# Patient Record
(demographics unavailable — no encounter records)

---

## 2025-01-22 NOTE — HISTORY OF PRESENT ILLNESS
[FreeTextEntry1] : JESS MORENO is a 7 mo ex FT normally developing F with Multicystic Kidney Disease (MKD) here for abnormal facial movements consistent of twitching of the R side of the face  HPI Normal , FT, no complications.  Passed hearing and  screening Normal development MKD- follows with Urology  1.5 mo started twitching R side of her face. She used to do it constantly at the beginning and now is a little less.  May happen more when she is excited. Lasts for few seconds. May happen several times per day. No clear associated LOC   FHx Dad has seizures.

## 2025-01-22 NOTE — PHYSICAL EXAM
[Well-appearing] : well-appearing [Normocephalic] : normocephalic [Anterior fontanel- Open] : anterior fontanel- open [Anterior fontanel- Soft] : anterior fontanel- soft [Anterior fontanel- Flat] : anterior fontanel- flat [No dysmorphic facial features] : no dysmorphic facial features [No ocular abnormalities] : no ocular abnormalities [Neck supple] : neck supple [No abnormal neurocutaneous stigmata or skin lesions] : no abnormal neurocutaneous stigmata or skin lesions [No deformities] : no deformities [Alert] : alert [Regards] : regards [Smiling] : smiling [Cooing] : cooing [Babbling] : babbling [Pupils reactive to light] : pupils reactive to light [Turns to light] : turns to light [Tracks face, light or objects with full extraocular movements] : tracks face, light or objects with full extraocular movements [No facial asymmetry or weakness] : no facial asymmetry or weakness [No nystagmus] : no nystagmus [Responds to voice/sounds] : responds to voice/sounds [Midline tongue] : midline tongue [No fasciculations] : no fasciculations [Normal axial and appendicular muscle tone with symmetric limb movements] : normal axial and appendicular muscle tone with symmetric limb movements [Normal bulk] : normal bulk [Reaches for toys] : reaches for toys [Good  bilaterally] : good  bilaterally [Lift head in prone] : lift head in prone [Roll over] : roll over [Tripod] : tripod [No abnormal involuntary movements] : no abnormal involuntary movements [2+ biceps] : 2+ biceps [Knee jerks] : knee jerks [Ankle jerks] : ankle jerks [No ankle clonus] : no ankle clonus [Responds to touch and tickle] : responds to touch and tickle [No dysmetria in reaching for objects] : no dysmetria in reaching for objects [de-identified] : no resp distress

## 2025-01-22 NOTE — ASSESSMENT
[FreeTextEntry1] : JESS MORENO is a 7 mo ex FT normally developing F with Multicystic Kidney Disease (MKD) here for abnormal facial movements consistent of twitching of the R side of the face lasting for 1-3 seconds at a time, that started 1-2 months ago.   Episodes seem to be less often now, but still happen multiple times a week. Mom feels they may be more frequent when she is excited. No clear associated LOC.   Given this, and normal exam, low suspicion for seizures, but father has epilepsy and so will do rEEG/AEEG to try to capture and characterize events

## 2025-03-07 NOTE — CONSULT LETTER
[Dear  ___] : Dear  [unfilled], [Consult Letter:] : I had the pleasure of evaluating your patient, [unfilled]. [Please see my note below.] : Please see my note below. [Consult Closing:] : Thank you very much for allowing me to participate in the care of this patient.  If you have any questions, please do not hesitate to contact me. [Sincerely,] : Sincerely, [FreeTextEntry3] : Jessica Thompson MD Department of Dermatology [facility]

## 2025-03-07 NOTE — HISTORY OF PRESENT ILLNESS
[FreeTextEntry1] : np rash [de-identified] : Referred by: Dr. Diaz Ms. JESS MORENO  is a 9 month old F here for evaluation of below  #eczema x months, becoming itchier over the winter  S: aquaphor M: aquaphor D: All F&C, no fs/ds/wb/fb  sibling with eczema no personal or family h/o skin cancer

## 2025-03-07 NOTE — CONSULT LETTER
[Dear  ___] : Dear  [unfilled], [Consult Letter:] : I had the pleasure of evaluating your patient, [unfilled]. [Please see my note below.] : Please see my note below. [Consult Closing:] : Thank you very much for allowing me to participate in the care of this patient.  If you have any questions, please do not hesitate to contact me. [Sincerely,] : Sincerely, [FreeTextEntry3] : Jessica Thompson MD Department of Dermatology [facility]

## 2025-03-07 NOTE — PHYSICAL EXAM
[Alert] : alert [FreeTextEntry3] : eczematous patches on cheeks, forehead, trunk, extremities sparing groin follicular eczema on trunk xerosis

## 2025-03-07 NOTE — ASSESSMENT
[Use of independent historian: [ enter independent historian's relationship to patient ] :____] : As the patient was unable to provide a complete and reliable history, I obtained clinical history from the patient's [unfilled] [FreeTextEntry1] : # Atopic dermatitis, with follicular eczema on trunk,  flared with #xerosis discussed nature, chronicity and unpredictable course Reviewed dry skin care, including bathing routines, emollients, and fragrance-free products. - Advised moisturizing w/ plain Vaseline. - start triamcinolone 0.1% ointment BID to AA on body PRN roughness.  - start hydrocortisone 2.5% ointment BID to AA on face PRN roughness.  - Reviewed SE of topical steroids including skin thinning and discoloration and discussed avoidance of prolonged use. - start dilute bleach baths. Instructions provided. - start modified short sleeve onesie wet wraps qHS. Instructions provided. - fragrance-free diapers.   RTC 2 weeks

## 2025-03-07 NOTE — HISTORY OF PRESENT ILLNESS
[FreeTextEntry1] : np rash [de-identified] : Referred by: Dr. Diaz Ms. JESS MORENO  is a 9 month old F here for evaluation of below  #eczema x months, becoming itchier over the winter  S: aquaphor M: aquaphor D: All F&C, no fs/ds/wb/fb  sibling with eczema no personal or family h/o skin cancer

## 2025-03-20 NOTE — PHYSICAL EXAM
[Alert] : alert [FreeTextEntry3] : hyperpigmented thin slightly rough patches on cheeks xerosis scalp clear

## 2025-03-20 NOTE — ASSESSMENT
[Use of independent historian: [ enter independent historian's relationship to patient ] :____] : As the patient was unable to provide a complete and reliable history, I obtained clinical history from the patient's [unfilled] [FreeTextEntry1] : # Atopic dermatitis, with follicular eczema on trunk,  improved on body, with mild residuum on face with #xerosis discussed nature, chronicity and unpredictable course improved but still active/flaring, not at treatment goal Reviewed dry skin care, including bathing routines, emollients, and fragrance-free products. - Advised moisturizing w/ plain Vaseline. - start fluocinolone scalp oil in scalp daily PRN pruritus, no longer than 2 weeks at a time, SED  - can start eucrisa to any minimally rough areas if covered, SED including initial stinging sensation - parent deferred tacro for face - triamcinolone 0.1% ointment BID to AA on body PRN roughness.  - hydrocortisone 2.5% ointment BID to AA on face PRN roughness.  - Reviewed SE of topical steroids including skin thinning and discoloration and discussed avoidance of prolonged use.  Post-inflammatory hyperpigmentation  - Discussed natural history and slow time course for resolution  - Photoprotection reviewed including sun-protective behaviors, protective clothing, and the use of broad-spectrum SPF 30+ sunscreens was advised

## 2025-03-20 NOTE — CONSULT LETTER
[Dear  ___] : Dear  [unfilled], [Consult Letter:] : I had the pleasure of evaluating your patient, [unfilled]. [Please see my note below.] : Please see my note below. [Consult Closing:] : Thank you very much for allowing me to participate in the care of this patient.  If you have any questions, please do not hesitate to contact me. [Sincerely,] : Sincerely, [FreeTextEntry3] : Jessica Thompson MD Department of Dermatology [facility]

## 2025-03-20 NOTE — HISTORY OF PRESENT ILLNESS
[FreeTextEntry1] : fp rash [de-identified] : Referred by: Dr. Diaz Ms. JESS MORENO  is a 9 month old F here for evaluation of below  #eczema x months, improved significantly w/ boot [employer] but on face its improved not clear . also has been itchy in scalp x months hx: becoming itchier over the winter  S: aquaphor M: aquaphor D: All F&C, no fs/ds/wb/fb  sibling with eczema no personal or family h/o skin cancer

## 2025-05-27 NOTE — HISTORY OF PRESENT ILLNESS
[TextBox_4] : JESS is here for a follow up visit for multicystic kidney. Initial in office ultrasounds (7/2/24) demonstrated left multicystic kidney. USVCUG (7/25/24) demonstrated No evidence of vesicoureteral reflux. Left multicystic dysplastic kidney. She returns today for repeat in-office ultrasounds. Since the last visit, she has been well without any UTIs, unexplained fevers, voiding complaints, issues feeding.

## 2025-05-27 NOTE — CONSULT LETTER
[FreeTextEntry1] : Dear Dr. MIRIAM BRANCH,      I had the pleasure of seeing JESS MORENO for follow up today.  Below is my note regarding the office visit today.      Thank you so very much for allowing me to participate in JESS's care.  Please don't hesitate to call me should any questions or issues arise.         Sincerely,     Dominic Zimmer MD, FACS, [facility]    Chief, Pediatric Urology     Professor of Urology and Pediatrics     [employer]         President, [employer] Association - [place] Section    Past-President, Societies for Pediatric U[employer]

## 2025-05-27 NOTE — CONSULT LETTER
[FreeTextEntry1] : Dear Dr. MIRIAM BRANCH,      I had the pleasure of seeing JESS MORENO for follow up today.  Below is my note regarding the office visit today.      Thank you so very much for allowing me to participate in JESS's care.  Please don't hesitate to call me should any questions or issues arise.         Sincerely,     Dominic Zimmer MD, FACS, [facility]    Chief, Pediatric Urology     Professor of Urology and Pediatrics     [employer] [employer]         President, American [employer] Association - [place] Section    Past-President, Societies [employer] Pediatric U[employer]

## 2025-05-27 NOTE — ASSESSMENT
[FreeTextEntry1] : Pattie has a stable left MCDK without reflux.  We discussed the results and the management options.  At this time, I recommended continued surveillance with another sonogram in 1 year.  All questions were answered to their satisfaction.

## 2025-05-27 NOTE — CONSULT LETTER
[FreeTextEntry1] : Dear Dr. MIRIAM BRANCH,      I had the pleasure of seeing JESS MORENO for follow up today.  Below is my note regarding the office visit today.      Thank you so very much for allowing me to participate in JESS's care.  Please don't hesitate to call me should any questions or issues arise.         Sincerely,     Dominic Zimmer MD, FACS, [facility]    Chief, Pediatric Urology     Professor of Urology and Pediatrics     [employer] [facility] [employer]         President, American U[employer] Association - [place] Section    Past-President, Societies [employer] Pediatric U[employer]

## 2025-05-27 NOTE — DATA REVIEWED
[FreeTextEntry1] : EXAMINATION: US RENAL AND PELVIS TODAY IN OFFICE     FINDINGS: LEFT MCDK (LARGEST CYST 13 MM IN LEFT MIDPOLE) OTHERWISE UNREMARKABLE KIDNEY AND PELVIC STRUCTURES.

## 2025-05-27 NOTE — CONSULT LETTER
[FreeTextEntry1] : Dear Dr. MIRIAM BRANCH,      I had the pleasure of seeing JESS MORENO for follow up today.  Below is my note regarding the office visit today.      Thank you so very much for allowing me to participate in JESS's care.  Please don't hesitate to call me should any questions or issues arise.         Sincerely,     Dominic Zimmer MD, FACS, \[facility]\""U    Chief, Pediatric Urology     Professor of Urology and Pediatrics     [employer]         President, American [employer] Association - [place] Section    Past-President, Societies [employer] Pediatric U[employer]